# Patient Record
Sex: MALE | Race: WHITE | NOT HISPANIC OR LATINO | Employment: OTHER | URBAN - METROPOLITAN AREA
[De-identification: names, ages, dates, MRNs, and addresses within clinical notes are randomized per-mention and may not be internally consistent; named-entity substitution may affect disease eponyms.]

---

## 2023-11-29 ENCOUNTER — HOSPITAL ENCOUNTER (EMERGENCY)
Facility: CLINIC | Age: 68
Discharge: HOME | End: 2023-11-29
Attending: EMERGENCY MEDICINE | Admitting: EMERGENCY MEDICINE
Payer: MEDICARE

## 2023-11-29 ENCOUNTER — APPOINTMENT (OUTPATIENT)
Dept: GENERAL RADIOLOGY | Facility: CLINIC | Age: 68
End: 2023-11-29
Attending: EMERGENCY MEDICINE
Payer: MEDICARE

## 2023-11-29 VITALS
SYSTOLIC BLOOD PRESSURE: 97 MMHG | WEIGHT: 196.87 LBS | OXYGEN SATURATION: 98 % | HEART RATE: 80 BPM | TEMPERATURE: 98.1 F | RESPIRATION RATE: 20 BRPM | DIASTOLIC BLOOD PRESSURE: 82 MMHG

## 2023-11-29 DIAGNOSIS — S60.222A CONTUSION OF LEFT HAND, INITIAL ENCOUNTER: ICD-10-CM

## 2023-11-29 DIAGNOSIS — S42.92XA CLOSED FRACTURE OF LEFT SHOULDER, INITIAL ENCOUNTER: ICD-10-CM

## 2023-11-29 DIAGNOSIS — S43.005A SHOULDER DISLOCATION, LEFT, INITIAL ENCOUNTER: ICD-10-CM

## 2023-11-29 PROCEDURE — 250N000011 HC RX IP 250 OP 636: Performed by: EMERGENCY MEDICINE

## 2023-11-29 PROCEDURE — 23650 CLTX SHO DSLC W/MNPJ WO ANES: CPT | Mod: LT

## 2023-11-29 PROCEDURE — 99152 MOD SED SAME PHYS/QHP 5/>YRS: CPT

## 2023-11-29 PROCEDURE — 999N000065 XR SHOULDER LEFT PORT G/E 2 VIEWS: Mod: LT

## 2023-11-29 PROCEDURE — 99291 CRITICAL CARE FIRST HOUR: CPT | Mod: 25

## 2023-11-29 PROCEDURE — 250N000013 HC RX MED GY IP 250 OP 250 PS 637: Performed by: EMERGENCY MEDICINE

## 2023-11-29 PROCEDURE — 24500 CLTX HUMRL SHFT FX W/O MNPJ: CPT | Mod: LT

## 2023-11-29 PROCEDURE — 73030 X-RAY EXAM OF SHOULDER: CPT | Mod: LT

## 2023-11-29 PROCEDURE — 73130 X-RAY EXAM OF HAND: CPT | Mod: LT

## 2023-11-29 PROCEDURE — 96374 THER/PROPH/DIAG INJ IV PUSH: CPT

## 2023-11-29 RX ORDER — OXYCODONE HYDROCHLORIDE 5 MG/1
5 TABLET ORAL EVERY 4 HOURS PRN
Qty: 12 TABLET | Refills: 0 | Status: SHIPPED | OUTPATIENT
Start: 2023-11-29 | End: 2023-12-02

## 2023-11-29 RX ORDER — FENTANYL CITRATE 50 UG/ML
100 INJECTION, SOLUTION INTRAMUSCULAR; INTRAVENOUS
Status: COMPLETED | OUTPATIENT
Start: 2023-11-29 | End: 2023-11-29

## 2023-11-29 RX ORDER — SENNA AND DOCUSATE SODIUM 50; 8.6 MG/1; MG/1
1 TABLET, FILM COATED ORAL
Qty: 15 TABLET | Refills: 0 | Status: SHIPPED | OUTPATIENT
Start: 2023-11-29

## 2023-11-29 RX ORDER — OXYCODONE AND ACETAMINOPHEN 5; 325 MG/1; MG/1
2 TABLET ORAL ONCE
Status: COMPLETED | OUTPATIENT
Start: 2023-11-29 | End: 2023-11-29

## 2023-11-29 RX ORDER — PROPOFOL 10 MG/ML
30 INJECTION, EMULSION INTRAVENOUS ONCE
Status: COMPLETED | OUTPATIENT
Start: 2023-11-29 | End: 2023-11-29

## 2023-11-29 RX ORDER — PROPOFOL 10 MG/ML
1 INJECTION, EMULSION INTRAVENOUS ONCE
Status: COMPLETED | OUTPATIENT
Start: 2023-11-29 | End: 2023-11-29

## 2023-11-29 RX ORDER — FLUMAZENIL 0.1 MG/ML
0.2 INJECTION, SOLUTION INTRAVENOUS
Status: DISCONTINUED | OUTPATIENT
Start: 2023-11-29 | End: 2023-11-29 | Stop reason: HOSPADM

## 2023-11-29 RX ADMIN — PROPOFOL 30 MG: 10 INJECTION, EMULSION INTRAVENOUS at 19:58

## 2023-11-29 RX ADMIN — FENTANYL CITRATE 100 MCG: 50 INJECTION, SOLUTION INTRAMUSCULAR; INTRAVENOUS at 18:58

## 2023-11-29 RX ADMIN — OXYCODONE HYDROCHLORIDE AND ACETAMINOPHEN 2 TABLET: 5; 325 TABLET ORAL at 21:12

## 2023-11-29 RX ADMIN — PROPOFOL 50 MG: 10 INJECTION, EMULSION INTRAVENOUS at 19:54

## 2023-11-29 ASSESSMENT — ACTIVITIES OF DAILY LIVING (ADL)
ADLS_ACUITY_SCORE: 35
ADLS_ACUITY_SCORE: 37

## 2023-11-29 NOTE — ED TRIAGE NOTES
Patient had a fall today in his attic landing on his left shoulder. Patient went to [facility] and was told it was dislocated, they tried to relocate it but were unable.       Triage Assessment (Adult)       Row Name 11/29/23 0456          Triage Assessment    Airway WDL WDL        Respiratory WDL    Respiratory WDL WDL        Skin Circulation/Temperature WDL    Skin Circulation/Temperature WDL WDL        Peripheral/Neurovascular WDL    Peripheral Neurovascular WDL WDL

## 2023-11-29 NOTE — ED NOTES
Bed: ED03  Expected date: 11/29/23  Expected time: 5:15 PM  Means of arrival:   Comments:  Hold for triage

## 2023-11-29 NOTE — ED PROVIDER NOTES
History     Chief Complaint:  Shoulder Injury     The history is provided by the patient and the spouse.      Mario Rizo is a 68 year old male who presents to the ED with his wife from [facility] for evaluation of a shoulder injury. Mario reports he fell in his attic today around 1400, landing on his left shoulder. He states he hit his head on his shoulder but did not lose consciousness. He currently endorses left shoulder and left wrist pain. He has an abrasion to his left thigh and right shin. He presented to [facility] where left shoulder and wrist x-rays were completed (without interpretation per the patient and wife) and demonstrated dislocation. They attempted to reduce the left shoulder and failed, prompting them to send the patient to [facility]. He denies headache, abdominal pain, leg pain, chest pain, back pain, neck pain, or rash. No blood thinners use. No history of anesthesia complications. Hearing aids at baseline. Last ate a granola bar at 1600.    Independent Historian:   Wife supports history above.    Review of External Notes:   [facility] paperwork from today brought in by the patient.     Medications:    Pepcid  Synthroid  Lipitor  Zoloft  Protonix    Past Medical History:    Hypothyroidism due to Hashimoto's thyroiditis  Dyslipidemia  MDD  Esophagitis  Adenomatous colon polyp  Closed Galeazzi's fracture right radius  Carpal tunnel syndrome    Past Surgical History:    Right rotator cuff surgery  Herniorrhaphy, left inguinal  Wrist fracture    Physical Exam   Patient Vitals for the past 24 hrs:   BP Temp Pulse Resp SpO2 Weight   11/29/23 2100 -- -- 80 -- -- --   11/29/23 2100 97/82 -- 80 20 98 % --   11/29/23 2045 130/75 -- 79 15 97 % --   11/29/23 2030 -- -- 80 -- -- --   11/29/23 2030 136/89 -- 83 15 98 % --   11/29/23 2015 -- -- 81 -- -- --   11/29/23 2015 (!) 141/79 -- 82 23 97 % --   11/29/23 2010 (!) 140/82 -- 70 10 99 % --   11/29/23 2005 135/79  -- 72 16 99 % --   11/29/23 2000 -- -- 73 -- -- --   11/29/23 2000 131/89 -- 76 26 98 % --   11/29/23 1955 (!) 151/88 -- 80 12 98 % --   11/29/23 1949 (!) 156/101 -- 78 13 96 % --   11/29/23 1944 -- -- 79 14 94 % --   11/29/23 1944 (!) 149/88 -- 77 11 97 % --   11/29/23 1938 (!) 134/91 -- 81 18 95 % --   11/29/23 1934 (!) 134/91 -- 81 -- 95 % --   11/29/23 1928 -- -- -- -- -- 89.3 kg (196 lb 13.9 oz)   11/29/23 1740 (!) 163/98 -- 99 -- 98 % --   11/29/23 1714 (!) 147/99 98.1  F (36.7  C) 87 18 99 % --      Physical Exam  VS: Reviewed per above  HENT: Mucous membranes moist. Uvula midline, no tonsillar hypertrophy nor asymmetry. Tolerating secretions, normal phonation. No nuchal rigidity.  EYES: sclera anicteric  CV: Rate as noted, regular rhythm.   RESP: Effort normal. Breath sounds are normal bilaterally.  Abdomen: no focal tenderness.  no rebound or guarding.  NEURO: Alert, moving all extremities.  Sensation intact to light touch in the left upper extremity.  Intact left wrist extension, thumb/pinky opposition, finger abduction in the left hand.  MSK: No deformity of the extremities.  Scaphoid deformity at the left shoulder and limited range of motion of left shoulder.  Intact left radial pulse at the wrist.  Mild tenderness of the left dorsal hand.  SKIN: Warm and dry      Emergency Department Course     Imaging:  Shoulder XR, left  2-3 vw PORTABLE   Final Result   IMPRESSION: Interval reduction of previous anterior glenohumeral joint dislocation. Displaced fracture of the greater tuberosity and Hill-Sachs lesion humeral head. The suspected humeral neck fracture on the prior exam is not definitively identified on    this exam. Likely additional displaced fracture of the inferior glenoid. Diffuse bony demineralization.      XR Hand Left G/E 3 Views   Final Result   IMPRESSION: Normal joint spaces and alignment. No fracture.      XR Shoulder Left 2 Views   Final Result   IMPRESSION: Persistent anterior glenohumeral  joint dislocation. Comminuted fracture of the humeral neck, head, and greater tuberosity stable. There may be an inferior glenoid fracture.         Report per radiology    Procedures     Fracture Reduction     Procedure: Fracture Reduction     Indication: Left humeral neck, head, and greater tuberosity fracture    Location: Left Shoulder    Consent: Written from Patient   Risks (including but not limited to: neurologic compromise, vascular injury, pain, and inability to reduce fracture), benefits, and alternatives were discussed with patient and spouse and consent for procedure was obtained.     Universal Protocol: Universal protocol was followed and time out conducted just prior to starting procedure, confirming patient identity, site/side, procedure, patient position, and availability of correct equipment and implants.     Anesthesia/Sedation: Sedation: The patient was sedated, see separate procedure note for details.     Procedure Detail: I manually manipulated and reduced the fracture.  Immobilized with: Sling/shoulder immobilizer  Post-reduction x-ray showed adequate reduction/not adequate reduction   Post-procedure distal neurovascular function was intact.    Patient Status:  The patient tolerated the procedure well: Yes. There were no complications.       Sedation     Procedure: Procedural Sedation    Sedation Level: Moderate    Indication: Joint reduction and Fracture reduction    Consent: Written from Patient     Universal protocol: Universal protocol was followed and time out conducted just prior to starting procedure, confirming patient identity, site/side, procedure, patient position, and availability of correct equipment and implants.     Last PO Intake: Emergent procedure    ASA Class: Class 1 - A normal healthy patient     Exam:  Mallampati:  Grade 1 - Soft palate, uvula, and pillars visible    Lungs: Clear   Heart: Regular rate and rhythm     Medication: Propofol  Dose: 80 mg      Monitoring:  Monitoring consisted of heart rate, cardiac, continuous pulse oximetry, frequent blood pressure checks.   There was constant attendance by RN until patient recovered and constant attendance by physician until patient stable.   Intubation and emergency airway equipment available.     Response: Vital signs stable, oxygen saturations greater than 92%       Patient Status: Post procedure patient was alert.     Total Physician Drug Administration / Monitoring Time: 10 minutes.     Patient was monitored during recovery and returned to pre-procedure baseline.      Emergency Department Course & Assessments:    Interventions:  Medications   flumazenil (ROMAZICON) injection 0.2 mg (has no administration in time range)   fentaNYL (PF) (SUBLIMAZE) injection 100 mcg (100 mcg Intravenous $Given 11/29/23 1858)   propofol (DIPRIVAN) injection 10 mg/mL vial (50 mg Intravenous $Given 11/29/23 1954)   propofol (DIPRIVAN) injection 10 mg/mL vial (30 mg Intravenous $Given 11/29/23 1958)   oxyCODONE-acetaminophen (PERCOCET) 5-325 MG per tablet 2 tablet (2 tablets Oral $Given 11/29/23 2112)     Assessments:  1728 I obtained history and examined the patient as noted above.   1850 I rechecked the patient and explained findings. I attempted to reduce the left shoulder without success. We will sedate the patient and attempt again.  1953 I sedated and reduced the patient's left shoulder successfully.    Independent Interpretation (X-rays, CTs, rhythm strip):  Post reduction shoulder xrays revealed successful reduction.    Consultations/Discussion of Management or Tests:  1845 I spoke with Dr. Campos of [facility] regarding the patient's history and presentation in the emergency department today.     Social Determinants of Health affecting care:   None    Disposition:  The patient was discharged to home.     Impression & Plan    CMS Diagnoses: None    Medical Decision Making:  Patient presents to the ER for evaluation of left  shoulder dislocation.  Vital signs reassuring.  Patient sustained this shoulder injury after mechanical fall earlier today.  X-rays here reveal left shoulder dislocation with associated fractures involving the humeral neck, head, greater tuberosity and possibly inferior glenoid.  X-ray of the left hand was negative.  I discussed with orthopedics who recommended attempted closed reduction.  After attempts at bedside reduction with fentanyl only were unsuccessful, patient was consented for sedation.  With propofol, reduction was performed successfully.  He was placed in sling and referred to orthopedics for further evaluation.  Pain medication prescription sent electronically to pharmacy of choice.  Return precaution discussed with patient and spouse prior to discharge.    Diagnosis:    ICD-10-CM    1. Shoulder dislocation, left, initial encounter  S43.005A       2. Contusion of left hand, initial encounter  S60.222A       3. Closed fracture of left shoulder, initial encounter  S42.92XA           Discharge Medications:  Discharge Medication List as of 11/29/2023  9:07 PM        START taking these medications    Details   oxyCODONE (ROXICODONE) 5 MG tablet Take 1 tablet (5 mg) by mouth every 4 hours as needed for pain, Disp-12 tablet, R-0, E-Prescribe      SENNA-docusate sodium (SENNA S) 8.6-50 MG tablet Take 1 tablet by mouth nightly as needed (constipation prevention while taking oxycodone), Disp-15 tablet, R-0, E-Prescribe              Scribe Disclosure:  I, Neetu Santiago, am serving as a scribe at 5:37 PM on 11/29/2023 to document services personally performed by Juanpablo Perdomo MD based on my observations and the provider's statements to me.   11/29/2023   Juanpablo Perdomo MD Lindenbaum, Elan, MD  11/29/23 6038